# Patient Record
Sex: FEMALE | Race: WHITE | NOT HISPANIC OR LATINO | Employment: OTHER | URBAN - METROPOLITAN AREA
[De-identification: names, ages, dates, MRNs, and addresses within clinical notes are randomized per-mention and may not be internally consistent; named-entity substitution may affect disease eponyms.]

---

## 2021-05-31 ENCOUNTER — RECORDS - HEALTHEAST (OUTPATIENT)
Dept: ADMINISTRATIVE | Facility: CLINIC | Age: 61
End: 2021-05-31

## 2023-08-28 ENCOUNTER — TELEPHONE (OUTPATIENT)
Dept: VASCULAR SURGERY | Facility: CLINIC | Age: 63
End: 2023-08-28
Payer: MEDICARE

## 2023-08-28 ENCOUNTER — OFFICE VISIT (OUTPATIENT)
Dept: VASCULAR SURGERY | Facility: CLINIC | Age: 63
End: 2023-08-28
Payer: MEDICARE

## 2023-08-28 VITALS
HEART RATE: 85 BPM | SYSTOLIC BLOOD PRESSURE: 137 MMHG | OXYGEN SATURATION: 99 % | DIASTOLIC BLOOD PRESSURE: 69 MMHG | TEMPERATURE: 97.7 F

## 2023-08-28 DIAGNOSIS — R60.0 LOWER EXTREMITY EDEMA: ICD-10-CM

## 2023-08-28 DIAGNOSIS — L97.912 SKIN ULCER OF RIGHT LOWER LEG WITH FAT LAYER EXPOSED (H): Primary | ICD-10-CM

## 2023-08-28 PROBLEM — I10 ESSENTIAL HYPERTENSION: Status: ACTIVE | Noted: 2019-06-21

## 2023-08-28 PROCEDURE — 99204 OFFICE O/P NEW MOD 45 MIN: CPT | Mod: 25 | Performed by: FAMILY MEDICINE

## 2023-08-28 PROCEDURE — 11042 DBRDMT SUBQ TIS 1ST 20SQCM/<: CPT | Performed by: FAMILY MEDICINE

## 2023-08-28 PROCEDURE — G0463 HOSPITAL OUTPT CLINIC VISIT: HCPCS | Mod: 25 | Performed by: FAMILY MEDICINE

## 2023-08-28 RX ORDER — SULFAMETHOXAZOLE/TRIMETHOPRIM 800-160 MG
1 TABLET ORAL
COMMUNITY
Start: 2023-08-25 | End: 2023-09-04

## 2023-08-28 RX ORDER — AMLODIPINE BESYLATE 10 MG/1
10 TABLET ORAL DAILY
COMMUNITY
Start: 2022-05-07

## 2023-08-28 ASSESSMENT — PAIN SCALES - GENERAL: PAINLEVEL: MILD PAIN (2)

## 2023-08-28 NOTE — PATIENT INSTRUCTIONS
Wound care supplies were ordered today through [employer] and if you are not receiving your supplies or have a question on your bill please contact [facility] at [phone]. Please allow 2-5 business days for delivery of supplies. You may get a call from a [phone] # if there are additional information [facility] needs. It is important to  or return their call. PLEASE NOTE: If you need to return your supplies, you MUST call customer service within 15 days of delivery date.         Wound Care Instructions    EVERY OTHER DAY and as needed    1. Cleanse your wound with saline, pat dry. DO NOT REMOVE OLD ENDOFORM- LEAVE ON THE WOUND    2.  Cut Endoform to the size of the wound. It is okay if it overlap the edges a small amount.  Then, moisten the Endoform with a drop or two saline.    3. Apply Endoform to wound (over the old Endoform) then cover with mepilex     Compression: tubigrip    *Endoform is naturally used by the body over time so you may not find it in the wound when you change your dressing.  If you do find some, gently cleanse the wound with saline. Do not remove the remaining Endoform*      It is ok to get your wound wet in the bath or shower    It is recommended that you elevate your legs throughout the day: approx 2-3 times each day  Elevate them above the level of your heart for 30 min.   Ways to do this:   Lay on the couch or your bed and prop your legs up on pillows   Recline back as far as you can go in your recliner and prop your legs on pillows.     Doing these things will help reduce the edema in your legs.    High Protein Foods  When you have an open ulcer, your bodies protein needs are much higher, so it is recommended eat good sources of protein or take a protein supplement!    Protein Supplements  -Premier Protein  -Ensure  -Boost  -Glucerna, if diabetic    Chicken  -Chicken breast, 3.5oz.-30 grams protein  -Chicken meat, cooked, 4 oz.    Beef  -Hamburger renuka, 4 oz-28 grams protein  -Steak,  6 oz-42 grams  -Most cuts of beef- 7 grams of protein per ounce    Fish  -Most fish fillets or steaks are about 22 grams of protein for 3 1/2 oz(100 grams) of cooked fish, or 6 grams per ounce  -Tuna, 6 oz can-40 grams of protein    Pork  -Pork chop, average-22 grams protein  -Pork loin or tenderloin, 4 oz.-29 grams  -Ham, 3oz serving- 19 grams  -Lantigua, 1 slice-3 grams    Eggs and Dairy  -Egg, large-7 grams  -Milk, 1 cup-8 grams  -Cottage cheese, 1/2 cup-15 grams  -Greek yogurt, 1 cup-usually 8-12 grams, check label    Beans  -Soy milk, 1 cup-6-10 grams  -Most beans(black, fong, lentils, etc.) about 7-10 grams protein per half cup of cooked beans    Nuts and Seeds  -Peanut butter, 2 Tablespoons- 8 grams protein  -Almonds, 1/4 cup- 8 grams  -Peanuts, 1/4 cup-9 grams  -Sunflower seeds, 1/4 cup- 6 grams        If for some reason you are not able to get your dressing(s) changed as outlined above (due to illness, lack of supplies, lack of help) please do the following: remove old, soiled dressings; wash the wounds with saline; pat dry; apply ABD pad or other absorbant pad and secure with rolled gauze; avoid tape directly on your skin; Call the clinic as soon as possible to let us know what the current issues are in receiving wound care [phone].      SEEK MEDICAL CARE IF:  You have an increase in swelling, pain, or redness around the wound.  You have an increase in the amount of pus coming from the wound.  There is a bad smell coming from the wound.  The wound appears to be worsening/enlarging  You have a fever greater than 101.5 F      It is ok to continue current wound care treatment/products for the next 2-3 days until new wound care supplies are ordered and arrive. If longer than this please contact our office at [phone].    If you have a 2 layer or 4 layer compression wrap on these are safe to have on for ONLY 7 days. If for some reason you are not able to get the wrap(s) changed (due to illness; lack of  supplies, lack of help, lack of transportation) please do the following: unwrap the old 2 or 4 layer compression wrap; avoid using scissors as you could cut your skin and cause wounds; use tubular compression when available. Call to reschedule your home care or clinic visit appointment as soon as possible.    Please NOTE: if you are 15 minutes late to your clinic appointment you will have to be rescheduled. Please call our clinic as soon as possible if you know you will not be able to get to your appointment at [phone].    If you fail to show up to 3 scheduled clinic appointments you will be dismissed from our clinic.              We want to hear from you!  In the next few weeks, you should receive a call or email to complete a survey about your visit at [facility]. Please help us improve your appointment experience by letting us know how we did today. We strive to make your experience good and value any ways in which we could do better.      We value your input and suggestions.    Thank you for choosing the [facility]!

## 2023-08-28 NOTE — LETTER
"[facility]  [address] [place] [address] [address]  [place], [place] [address]  [phone]      Fax [phone]    [facility]           Fax: [phone]            Customer Service: [phone]        Account #: [id number]    Wound Dressing Rx and Order Form  Order Status: new  Verbal:   Date: 2023     Alyssa Bhatia  Gender: female  : 1960  [address] [facility] [address]  [facility] [address]  [phone] (home)     Medical Record: [id number]  Primary Care Provider: No Ref-Primary, Physician      ICD-10-CM    1. Skin ulcer of right lower leg with fat layer exposed (H)  L97.912 DEBRIDE SKIN/SUBQ TISSUE     Wound care      2. Lower extremity edema  R60.0 Wound care            Insurance Info:  INSURER: Payor: MEDICARE / Plan: MEDICARE / Product Type: Medicare /   Policy ID#:  [id number]  SECONDARY INSURANCE:  [id number]  Secondary Policy ID#:  [id number]        Physician Info:   Name:  AUGUSTINE MEDINA     Dept Address/Phones:   [address] [place] [address], [address]  [facility] [address] [phone]  Fax: [phone]    Lymphedema circumferential measurements (in cm):       No data to display                  Wound info:  VASC Wound right leg (Active)   Pre Size Length 1.9 23 1300   Pre Size Width 4.2 23 1300   Pre Size Depth 0.1 23 1300   Pre Total Sq cm 7.98 23 1300   Post Size Length 1.9 23 1300   Post Size Width 4.2 23 1300   Post Size Depth 0.1 23 1300   Post Total Sq cm 7.98 23 1300   Number of days: 0        Drainage: moderate  Thickness:  full  Duration of Need: 30 DAYS  Days Supply: 30 DAYS  Start Date: 2023  Starter Kit: Ancillary Kit (saline, gloves, gauze)  Qualifying wound/Debridement: Yes      Dressing Type Brand Size Frequency of change -or- Quantity   Primary endoform  2\"x2\" EVERY OTHER DAY and as needed    Mepilex bordered adhesive bandage  3\"x3\" EVERY OTHER DAY and as needed     No substitutions preferred. Call " [phone].         OK to forward to covered supplier.    Electronically Signed Physician:  AUGUSTINE MEDINA             Date: August 28, 2023

## 2023-08-28 NOTE — PROGRESS NOTES
Wound Clinic Note         Visit date: 08/28/2023       Cheif Complaint:     Alyssa Bhatia is a 63 year old  female had concerns including R  leg wound.  The patient has a right leg ulcer.          HISTORY OF PRESENT ILLNESS:    Alyssa Bhatia reports the wound has been present since mid August 2023.  The wound began due to the area being bumped.   She has been started on antibiotic which she continues to take now with no symptoms of an adverse reaction.  Prior to this she has not had other difficult to heal wounds on the legs.  The patient denies a history of congestive heart failure, previous vein procedures or previous DVT.       The patient has been bandaging the wound with antibiotic ointment and a Band-Aid changed once a day.  There has been Light serous  drainage from the wound.       The pateint denies fevers or chills.  They report the pain from the wound has been 0/10 and has remained about the same recently.      The patient reports they currently do not have any routine for elevating their legs.  and The patient confirms they do sleep in a bed.     Today the patient reports maintaining a regular diet without special attention to protein.        The patient denies a history of diabetes, smoking or chronic steroid use.         The patient has recently had some symptoms of wound infection and is currently taking antibiotics which they have been tolerating well with no symptoms of an adverse reaction.       Problem List:   Past Medical History:   Diagnosis Date    Lower extremity edema 08/28/2023             Family Hx: family history is not on file.       Surgical Hx: No past surgical history on file.       Allergies:    Allergies   Allergen Reactions    Penicillins Other (See Comments)     Reaction as an infant - rash              Medication History:    Current Outpatient Medications   Medication Sig    amLODIPine (NORVASC) 10 MG tablet Take 10 mg by mouth daily    sulfamethoxazole-trimethoprim (BACTRIM  DS) 800-160 MG tablet Take 1 tablet by mouth     No current facility-administered medications for this visit.         Tobacco History:  reports that she has quit smoking. Her smoking use included cigarettes. She has never used smokeless tobacco.       REVIEW OF SYMPTOMS:   The review of systems was negative except as noted in the HPI.           PHYSICAL EXAMINATION:     /69   Pulse 85   Temp 97.7  F (36.5  C)   SpO2 99%            GENERAL: The patient overall appears well and is no acute distress.   HEAD: normocephalic   EYES: Sclera and conjunctiva clear   NECK: no obvious masses   LUNGS: breathing is unlabored.   EXTREMITIES: No clubbing, cyanosis or edema   SKIN: No rashes or other abnormalities except as noted under the Wound section below.   NEUROLOGICAL: normal motor and sensory function   EDEMA: Moderate       WOUND: The wound appears healthy with no sign of infection.   Wound bed: necrotic material  Periwound: healthy intact skin  Fairly small superficial wound on the right anterior shin.  As noted above there are are no signs of infection present today.      Also see below for wound details:       Circumferential volume measures:             No data to display                Ulceration(s)/Wound(s):   Please see the media tab under the chart review for pictures of the wounds.  Nursing staff removed dressings and cleansed wound.    VASC Wound right leg (Active)   Pre Size Length 1.9 08/28/23 1300   Pre Size Width 4.2 08/28/23 1300   Pre Size Depth 0.1 08/28/23 1300   Pre Total Sq cm 7.98 08/28/23 1300             No results for input(s): HGBA1C, A1C, EAG in the last 54097 hours.    Invalid input(s): PKHPYHROB5N       No results for input(s): ALBUMIN in the last 66832 hours.           Procedure note: , Informed Consent:  Patient acknowledges that I have explained the patient's general medical condition to him/her.  Patient has been informed and acknowledges that I have explained the risks or  complications of wound debridement including, but not limited to, scarring, damage to blood vessels or surrounding areas such as nerves and organs, allergic reactions to topical and injected anaesthetic and/or skin prep solutions.  Other risks include excessive bleeding, removal of healthy tissue, infection, pain and inflammation, and prolonged or failure to heal.  Patient acknowledges that bleeding after debridement and pain may worsen after debridement and that dead/necrotic tissue may cause bacteria and toxins to be released into the bloodstream and cause sepsis or shock.  Patient acknowledges that I have explained that the wound may be larger after debridement.  Patient acknowledges that they may need serial debridements while under care in the wound department.  Patient acknowledges that they were given an opportunity to ask questions about treatment and I have answered patient's questions.   , Anesthetized as needed with lidocaine. , Using a curette and/or a scalpel I performed an excisional debridement removing all necrotic material at the right leg wound in to the level of viable subcutaneous tissue.  I obtained hemostasis with direct pressure.  The patient tolerated the procedure well. , EBL: <5 ml , and Total debridement surface area: Less than 20 cm                  ASSESSMENT:   This is a 63 year old  female with a right leg ulcer. , the patient also has lower extremity edema which was also managed during today's clinic visit.          PLAN:   We will bandage the wound with endoform, Mepilex bandage and a Tubigrip stocking changed every other day.  We went over bathing instructions.    Separate from the wound care instructions we then discussed management strategies for lower extremity edema.  I explained the keys for managing lower extremity edema are compression and elevation.  I explained to the patient today that controlling the edema is probably the most important thing we can do to help heal the  wound.  I have specifically recommended that they lay down with their legs above the level of the heart for 30 minutes at least twice a day.     I have explained to the patient the importance of protein intake to wound healing.  I have explained that increasing protein intake will speed wound healing.  We discussed several types of food that are high in protein and the wound care nurse gave the patient a handout that summarizes this information.  In addition to further speed wound healing I have encouraged the patient to take a protein supplement.   The patient will return to the wound clinic in 2 to 3 weeks to see me again.        45 minutes spent on the date of the encounter doing chart review, history and exam, documentation and further activities per the note, this time excludes any procedure time      Herbert Baker MD  08/28/2023   1:36 PM   [facility]  [phone]    This note was electronically signed by Herbert Baker MD

## 2023-08-28 NOTE — TELEPHONE ENCOUNTER
Caller: Alyssa    Provider: MD Herbert Baker    Detailed reason for call: New self referral for non-healing wound. She is contacting [facility] to send recent visit notes.   Please confirm if ok to add at 1:00 today?    Email from patient:  Hello,   Please see my leg wound.. First picture is the day it happened and the second picture is today. It is red around the wound which is on my right ;lower leg. My son in law, who is a Dr at [employer] thought I needed to see a wound Dr.  His name is Alec Hernandez.  I'll work on getting notes from last Friday's visit.    Thanks!   Alyssa  [phone]    August 20, 2023 August 28. 2023        Best phone number to contact: [phone]    Best time to contact: any     Ok to leave a detailed message: Yes

## 2023-09-08 ENCOUNTER — TELEPHONE (OUTPATIENT)
Dept: VASCULAR SURGERY | Facility: CLINIC | Age: 63
End: 2023-09-08
Payer: MEDICARE

## 2023-09-08 NOTE — TELEPHONE ENCOUNTER
PT VIA EMAIL:     The 1st picture is after I got done showering. The 2nd is before I showered. I am on my last day of antibiotics.  Just making sure that I am making progress. It is a little red around the area. Please advise if there is anything I shadowed. Otherwise I will keep changing dressing every other day.  Thank you.  Alyssa Bhatia  [phone]

## 2023-09-14 ENCOUNTER — OFFICE VISIT (OUTPATIENT)
Dept: VASCULAR SURGERY | Facility: CLINIC | Age: 63
End: 2023-09-14
Attending: FAMILY MEDICINE
Payer: MEDICARE

## 2023-09-14 VITALS
TEMPERATURE: 97.8 F | DIASTOLIC BLOOD PRESSURE: 76 MMHG | RESPIRATION RATE: 16 BRPM | HEART RATE: 80 BPM | SYSTOLIC BLOOD PRESSURE: 142 MMHG

## 2023-09-14 DIAGNOSIS — R60.0 LOWER EXTREMITY EDEMA: ICD-10-CM

## 2023-09-14 DIAGNOSIS — L97.912 SKIN ULCER OF RIGHT LOWER LEG WITH FAT LAYER EXPOSED (H): Primary | ICD-10-CM

## 2023-09-14 PROCEDURE — G0463 HOSPITAL OUTPT CLINIC VISIT: HCPCS | Mod: 25 | Performed by: FAMILY MEDICINE

## 2023-09-14 PROCEDURE — 11042 DBRDMT SUBQ TIS 1ST 20SQCM/<: CPT | Performed by: FAMILY MEDICINE

## 2023-09-14 PROCEDURE — 99214 OFFICE O/P EST MOD 30 MIN: CPT | Mod: 25 | Performed by: FAMILY MEDICINE

## 2023-09-14 ASSESSMENT — PAIN SCALES - GENERAL: PAINLEVEL: NO PAIN (0)

## 2023-09-14 NOTE — PATIENT INSTRUCTIONS
Wound care supplies were not ordered or needed today.        Wound Care Instructions    EVERY OTHER DAY and as needed    1. Cleanse your wound with saline, pat dry. DO NOT REMOVE OLD ENDOFORM- LEAVE ON THE WOUND    2.  Cut Endoform to the size of the wound. It is okay if it overlap the edges a small amount.  Then, moisten the Endoform with a drop or two saline.    3. Apply Endoform to wound (over the old Endoform) then cover with mepilex     Compression: tubigrip    *Endoform is naturally used by the body over time so you may not find it in the wound when you change your dressing.  If you do find some, gently cleanse the wound with saline. Do not remove the remaining Endoform*      It is ok to get your wound wet in the bath or shower    It is recommended that you elevate your legs throughout the day: approx 2-3 times each day  Elevate them above the level of your heart for 30 min.   Ways to do this:   Lay on the couch or your bed and prop your legs up on pillows   Recline back as far as you can go in your recliner and prop your legs on pillows.     Doing these things will help reduce the edema in your legs.    High Protein Foods  When you have an open ulcer, your bodies protein needs are much higher, so it is recommended eat good sources of protein or take a protein supplement!    Protein Supplements  -Premier Protein  -Ensure  -Boost  -Glucerna, if diabetic    Chicken  -Chicken breast, 3.5oz.-30 grams protein  -Chicken meat, cooked, 4 oz.    Beef  -Hamburger renuka, 4 oz-28 grams protein  -Steak, 6 oz-42 grams  -Most cuts of beef- 7 grams of protein per ounce    Fish  -Most fish fillets or steaks are about 22 grams of protein for 3 1/2 oz(100 grams) of cooked fish, or 6 grams per ounce  -Tuna, 6 oz can-40 grams of protein    Pork  -Pork chop, average-22 grams protein  -Pork loin or tenderloin, 4 oz.-29 grams  -Ham, 3oz serving- 19 grams  -Lantigua, 1 slice-3 grams    Eggs and Dairy  -Egg, large-7 grams  -Milk, 1 cup-8  grams  -Cottage cheese, 1/2 cup-15 grams  -Greek yogurt, 1 cup-usually 8-12 grams, check label    Beans  -Soy milk, 1 cup-6-10 grams  -Most beans(black, fong, lentils, etc.) about 7-10 grams protein per half cup of cooked beans    Nuts and Seeds  -Peanut butter, 2 Tablespoons- 8 grams protein  -Almonds, 1/4 cup- 8 grams  -Peanuts, 1/4 cup-9 grams  -Sunflower seeds, 1/4 cup- 6 grams        If for some reason you are not able to get your dressing(s) changed as outlined above (due to illness, lack of supplies, lack of help) please do the following: remove old, soiled dressings; wash the wounds with saline; pat dry; apply ABD pad or other absorbant pad and secure with rolled gauze; avoid tape directly on your skin; Call the clinic as soon as possible to let us know what the current issues are in receiving wound care [phone].      SEEK MEDICAL CARE IF:  You have an increase in swelling, pain, or redness around the wound.  You have an increase in the amount of pus coming from the wound.  There is a bad smell coming from the wound.  The wound appears to be worsening/enlarging  You have a fever greater than 101.5 F      It is ok to continue current wound care treatment/products for the next 2-3 days until new wound care supplies are ordered and arrive. If longer than this please contact our office at [phone].    If you have a 2 layer or 4 layer compression wrap on these are safe to have on for ONLY 7 days. If for some reason you are not able to get the wrap(s) changed (due to illness; lack of supplies, lack of help, lack of transportation) please do the following: unwrap the old 2 or 4 layer compression wrap; avoid using scissors as you could cut your skin and cause wounds; use tubular compression when available. Call to reschedule your home care or clinic visit appointment as soon as possible.    Please NOTE: if you are 15 minutes late to your clinic appointment you will have to be rescheduled. Please call our  clinic as soon as possible if you know you will not be able to get to your appointment at [phone].    If you fail to show up to 3 scheduled clinic appointments you will be dismissed from our clinic.              We want to hear from you!  In the next few weeks, you should receive a call or email to complete a survey about your visit at [facility]. Please help us improve your appointment experience by letting us know how we did today. We strive to make your experience good and value any ways in which we could do better.      We value your input and suggestions.    Thank you for choosing the [facility]!

## 2023-09-14 NOTE — PROGRESS NOTES
Wound Clinic Note         Visit date: 09/14/2023       Cheif Complaint:     Alyssa Bhatia is a 63 year old  female had concerns including right leg wound.  The patient has a right leg ulcer.          HISTORY OF PRESENT ILLNESS:    Alyssa Bhatia reports the wound has been present since mid August 2023.  The wound began due to the area being bumped.    Prior to this she has not had other difficult to heal wounds on the legs.  The patient denies a history of congestive heart failure, previous vein procedures or previous DVT.       Since her last clinic visit with me she has been bandaging the wound with endoform, Mepilex bandage and a Tubigrip stocking changed every other day.  There is been light to moderate serous drainage from the wound.  There is been no difficulties with the dressing changes.  She has completed taking all antibiotics with no symptoms of an adverse reaction.         The pateint denies fevers or chills.  They report the pain from the wound has been 0/10 and has remained about the same recently.      The patient reports laying down to elevate their legs above the level of their heart at least twice a day.  and The patient confirms they do sleep in a bed.     Today the patient reports maintaining a high protein diet, but has not been taking protein supplements lately.        The patient denies a history of diabetes, smoking or chronic steroid use.         The patient has not had any symptoms of infection relating to the wound recently and is not currently on antibiotics.       Problem List:   Past Medical History:   Diagnosis Date    Hypertension     Lower extremity edema 08/28/2023             Family Hx: family history includes Breast Cancer in her maternal aunt; Cataracts in her mother; Colon Cancer in her father; Hypertension in her father, maternal grandmother, and mother; Macular Degeneration in her maternal aunt, maternal grandmother, and mother.       Surgical Hx:   Past Surgical History:    Procedure Laterality Date    APPENDECTOMY      DILATION AND CURETTAGE  01/25/2007          Allergies:    Allergies   Allergen Reactions    Penicillins Other (See Comments)     Reaction as an infant - rash              Medication History:    Current Outpatient Medications   Medication Sig    amLODIPine (NORVASC) 10 MG tablet Take 10 mg by mouth daily     No current facility-administered medications for this visit.         Tobacco History:  reports that she has quit smoking. Her smoking use included cigarettes. She has never used smokeless tobacco.       REVIEW OF SYMPTOMS:   The review of systems was negative except as noted in the HPI.           PHYSICAL EXAMINATION:     BP (!) 142/76   Pulse 80   Temp 97.8  F (36.6  C)   Resp 16            GENERAL: The patient overall appears well and is no acute distress.   HEAD: normocephalic   EYES: Sclera and conjunctiva clear   NECK: no obvious masses   LUNGS: breathing is unlabored.   EXTREMITIES: No clubbing, cyanosis or edema   SKIN: No rashes or other abnormalities except as noted under the Wound section below.   NEUROLOGICAL: normal motor and sensory function   EDEMA: Moderate       WOUND: The wound appears healthy with no sign of infection.   Wound bed: necrotic material  Periwound: healthy intact skin  Fairly small superficial wound on the right anterior shin.    Wound measurement is smaller today compared with her last clinic visit.      Also see below for wound details:       Circumferential volume measures:             No data to display                Ulceration(s)/Wound(s):   Please see the media tab under the chart review for pictures of the wounds.  Nursing staff removed dressings and cleansed wound.    VASC Wound right leg (Active)   Pre Size Length 1.2 09/14/23 1600   Pre Size Width 2.4 09/14/23 1600   Pre Size Depth 0.2 09/14/23 1600   Pre Total Sq cm 2.88 09/14/23 1600               No results for input(s): HGBA1C, A1C, EAG in the last 72000  hours.    Invalid input(s): [id number]       No results for input(s): ALBUMIN in the last 26436 hours.           Procedure note: , I had previous obtain informed consent from the patient to perform serial debridements, I confirmed this again with the patient today verbally. , Anesthetized as needed with lidocaine. , Using a curette and/or a scalpel I performed an excisional debridement removing all necrotic material at the right leg wound in to the level of viable subcutaneous tissue.  I obtained hemostasis with direct pressure.  The patient tolerated the procedure well. , EBL: <5 ml , and Total debridement surface area: Less than 20 cm                  ASSESSMENT:   This is a 63 year old  female with a right leg ulcer. , the patient also has lower extremity edema which was also managed during today's clinic visit.          PLAN:   We will bandage the wound with endoform, Mepilex bandage and a Tubigrip stocking changed every other day.  We went over bathing instructions.    Separate from the wound care instructions we then discussed management strategies for lower extremity edema.  I explained the keys for managing lower extremity edema are compression and elevation.  I have encouraged the patient to continue to elevate the legs as they have been doing, including laying down with their legs above the level of the heart for 30 minutes at least twice a day.     I have encouraged the patient to continue on their high protein diet to aid in wound healing.   The patient will return to the wound clinic in 2 to 3 weeks to see me again.        30 minutes spent on the date of the encounter doing chart review, history and exam, documentation and further activities per the note, this time excludes any procedure time      Herbert Baker MD  09/14/2023   4:15 PM   [facility]  [phone]    This note was electronically signed by Herbert Baker MD

## 2023-09-27 ENCOUNTER — TELEPHONE (OUTPATIENT)
Dept: VASCULAR SURGERY | Facility: CLINIC | Age: 63
End: 2023-09-27
Payer: MEDICARE

## 2023-09-27 NOTE — PATIENT INSTRUCTIONS
Wound care supplies were ordered today through [employer] and if you are not receiving your supplies or have a question on your bill please contact [facility] at [phone]. Please allow 2-5 business days for delivery of supplies. You may get a call from a [phone] # if there are additional information Zeenat needs. It is important to  or return their call. PLEASE NOTE: If you need to return your supplies, you MUST call customer service within 15 days of delivery date.         Wound Care Instructions    EVERY OTHER DAY and as needed    1. Cleanse your wound with saline, pat dry. DO NOT REMOVE OLD ENDOFORM- LEAVE ON THE WOUND    2.  Cut Endoform to the size of the wound. It is okay if it overlap the edges a small amount.  Then, moisten the Endoform with a drop or two saline.    3. Apply Endoform to wound (over the old Endoform) then cover with mepilex     Compression: tubigrip    *Endoform is naturally used by the body over time so you may not find it in the wound when you change your dressing.  If you do find some, gently cleanse the wound with saline. Do not remove the remaining Endoform*      It is ok to get your wound wet in the bath or shower    It is recommended that you elevate your legs throughout the day: approx 2-3 times each day  Elevate them above the level of your heart for 30 min.   Ways to do this:   Lay on the couch or your bed and prop your legs up on pillows   Recline back as far as you can go in your recliner and prop your legs on pillows.     Doing these things will help reduce the edema in your legs.        If for some reason you are not able to get your dressing(s) changed as outlined above (due to illness, lack of supplies, lack of help) please do the following: remove old, soiled dressings; wash the wounds with saline; pat dry; apply ABD pad or other absorbant pad and secure with rolled gauze; avoid tape directly on your skin; Call the clinic as soon as possible to let us know what the  current issues are in receiving wound care [phone].      SEEK MEDICAL CARE IF:  You have an increase in swelling, pain, or redness around the wound.  You have an increase in the amount of pus coming from the wound.  There is a bad smell coming from the wound.  The wound appears to be worsening/enlarging  You have a fever greater than 101.5 F      It is ok to continue current wound care treatment/products for the next 2-3 days until new wound care supplies are ordered and arrive. If longer than this please contact our office at [phone].    If you have a 2 layer or 4 layer compression wrap on these are safe to have on for ONLY 7 days. If for some reason you are not able to get the wrap(s) changed (due to illness; lack of supplies, lack of help, lack of transportation) please do the following: unwrap the old 2 or 4 layer compression wrap; avoid using scissors as you could cut your skin and cause wounds; use tubular compression when available. Call to reschedule your home care or clinic visit appointment as soon as possible.    Please NOTE: if you are 15 minutes late to your clinic appointment you will have to be rescheduled. Please call our clinic as soon as possible if you know you will not be able to get to your appointment at [phone].    If you fail to show up to 3 scheduled clinic appointments you will be dismissed from our clinic.              We want to hear from you!  In the next few weeks, you should receive a call or email to complete a survey about your visit at [facility]. Please help us improve your appointment experience by letting us know how we did today. We strive to make your experience good and value any ways in which we could do better.      We value your input and suggestions.    Thank you for choosing the [facility]!

## 2023-09-27 NOTE — TELEPHONE ENCOUNTER
Spoke with Alyssa. She will send a picture via [employer] tomorrow for review. Denies any other signs/symptoms of infection at this time.

## 2023-09-27 NOTE — TELEPHONE ENCOUNTER
Caller: Patient    Provider: MD Herbert Baker    Detailed reason for call: Patient is concerned with a possible infection; she states that she took her bandages off so she could shower, and noticed an increase in discharge coming from the wound as well as an odor. Patient has a follow up scheduled on Monday.     Best phone number to contact: [phone]    Best time to contact: Any    Ok to leave a detailed message: Yes    Ok to speak to authorized person if needed: No      (Noted to patient if reason is related to wound or incision, to please send a photo via email or [employer].)

## 2023-09-28 NOTE — TELEPHONE ENCOUNTER
Attempted to call patient to see if she was still having symptoms and if she was going to send a photo. Her mailbox is full.

## 2023-09-29 NOTE — TELEPHONE ENCOUNTER
Attempted to call patient (3rd attempt), voicemail full.  Sent patient [employer] message.  She is scheduled to be seen in clinic on Monday.

## 2023-10-02 ENCOUNTER — OFFICE VISIT (OUTPATIENT)
Dept: VASCULAR SURGERY | Facility: CLINIC | Age: 63
End: 2023-10-02
Payer: MEDICARE

## 2023-10-02 VITALS
SYSTOLIC BLOOD PRESSURE: 152 MMHG | HEART RATE: 72 BPM | TEMPERATURE: 97.9 F | RESPIRATION RATE: 18 BRPM | DIASTOLIC BLOOD PRESSURE: 84 MMHG

## 2023-10-02 DIAGNOSIS — R60.0 LOWER EXTREMITY EDEMA: ICD-10-CM

## 2023-10-02 DIAGNOSIS — L97.912 SKIN ULCER OF RIGHT LOWER LEG WITH FAT LAYER EXPOSED (H): Primary | ICD-10-CM

## 2023-10-02 PROCEDURE — G0463 HOSPITAL OUTPT CLINIC VISIT: HCPCS | Mod: 25 | Performed by: FAMILY MEDICINE

## 2023-10-02 PROCEDURE — 99214 OFFICE O/P EST MOD 30 MIN: CPT | Mod: 25 | Performed by: FAMILY MEDICINE

## 2023-10-02 PROCEDURE — 11042 DBRDMT SUBQ TIS 1ST 20SQCM/<: CPT | Performed by: FAMILY MEDICINE

## 2023-10-02 RX ORDER — LEVOFLOXACIN 750 MG/1
750 TABLET, FILM COATED ORAL DAILY
COMMUNITY
Start: 2023-09-29

## 2023-10-02 RX ORDER — DOXYCYCLINE HYCLATE 100 MG
100 TABLET ORAL DAILY
COMMUNITY
Start: 2023-09-29

## 2023-10-02 ASSESSMENT — PAIN SCALES - GENERAL: PAINLEVEL: MILD PAIN (2)

## 2023-10-02 NOTE — LETTER
"[facility]  [address]  [place], [place] [address]  [phone]      Fax [phone]    [facility]           Fax: [phone]            Customer Service: [phone]        Account #: [id number]    Wound Dressing Rx and Order Form  Order Status: refill  Verbal:   Date: 2023     Alyssa Bhatia  Gender: female  : 1960  [address] [place] [address]  [phone] (home)     Medical Record: [id number]  Primary Care Provider: No Ref-Primary, Physician      ICD-10-CM    1. Skin ulcer of right lower leg with fat layer exposed (H)  L97.912 DEBRIDE SKIN/SUBQ TISSUE      2. Lower extremity edema  R60.0             Insurance Info:  INSURER: Payor: MEDICARE / Plan: MEDICARE / Product Type: Medicare /   Policy ID#:  [id number]  SECONDARY INSURANCE:  AETNA  Secondary Policy ID#:  [id number]        Physician Info:   Name:  AUGUSTINE MEDINA     Dept Address/Phones:   [facility] [address] [facility] [facility], [address]  [place] [address] [phone]  Fax: [phone]    Lymphedema circumferential measurements (in cm):       No data to display                  Wound info:  VASC Wound right leg (Active)   Pre Size Length 1 10/02/23 1000   Pre Size Width 2 10/02/23 1000   Pre Size Depth 0.3 10/02/23 1000   Pre Total Sq cm 2 10/02/23 1000   Post Size Length 1 10/02/23 1000   Post Size Width 2 10/02/23 1000   Post Size Depth 0.3 10/02/23 1000   Post Total Sq cm 2 10/02/23 1000   Number of days: 35        Drainage: moderate  Thickness:  full  Duration of Need: 30 DAYS  Days Supply: 30 DAYS  Start Date: 10/2/2023  Starter Kit: Ancillary Kit (saline, gloves, gauze)  Qualifying wound/Debridement: Yes      Dressing Type Brand Size Frequency of change -or- Quantity   Primary Mepilex border adhesive bandage  3\"x3\" EVERY OTHER DAY and as needed     No substitutions preferred. Call [phone].         OK to forward to covered supplier.    Electronically Signed " Physician:  AUGUSTINE MEDINA             Date: October 2, 2023

## 2023-10-02 NOTE — PROGRESS NOTES
Wound Clinic Note         Visit date: 10/02/2023       Cheif Complaint:     Alyssa Bhatia is a 63 year old  female had concerns including Wound Check (Right leg wound).  The patient has a right leg ulcer.          HISTORY OF PRESENT ILLNESS:    Alyssa Bhatia reports the wound has been present since mid August 2023.  The wound began due to the area being bumped.    Prior to this she has not had other difficult to heal wounds on the legs.  The patient denies a history of congestive heart failure, previous vein procedures or previous DVT.       Since her last clinic visit with me she noticed an odor from the wound and felt some chills so she went to the urgent care clinic.  She was prescribed an antibiotic which she continues to take now with no symptoms of an adverse reaction.  She reports since then she has not had any further chills.    Since her last clinic visit with me she has been bandaging the wound with endoform, Mepilex bandage and a Tubigrip stocking changed every other day.  There is been light serous drainage from the wound.  There is been no difficulties with the dressing changes.     The pateint denies fevers or chills.  They report the pain from the wound has been 0/10 and has remained about the same recently.      The patient reports laying down to elevate their legs above the level of their heart at least twice a day.  and The patient confirms they do sleep in a bed.     Today the patient reports maintaining a high protein diet, but has not been taking protein supplements lately.        The patient denies a history of diabetes, smoking or chronic steroid use.         The patient has recently had some symptoms of wound infection and is currently taking antibiotics which they have been tolerating well with no symptoms of an adverse reaction.       Problem List:   Past Medical History:   Diagnosis Date    Hypertension     Lower extremity edema 08/28/2023             Family Hx: family history  includes Breast Cancer in her maternal aunt; Cataracts in her mother; Colon Cancer in her father; Hypertension in her father, maternal grandmother, and mother; Macular Degeneration in her maternal aunt, maternal grandmother, and mother.       Surgical Hx:   Past Surgical History:   Procedure Laterality Date    APPENDECTOMY      DILATION AND CURETTAGE  01/25/2007          Allergies:    Allergies   Allergen Reactions    Penicillins Other (See Comments)     Reaction as an infant - rash              Medication History:    Current Outpatient Medications   Medication Sig    amLODIPine (NORVASC) 10 MG tablet Take 10 mg by mouth daily    doxycycline hyclate (VIBRA-TABS) 100 MG tablet Take 100 mg by mouth daily    levofloxacin (LEVAQUIN) 750 MG tablet Take 750 mg by mouth daily     No current facility-administered medications for this visit.         Tobacco History:  reports that she has quit smoking. Her smoking use included cigarettes. She has never used smokeless tobacco.       REVIEW OF SYMPTOMS:   The review of systems was negative except as noted in the HPI.           PHYSICAL EXAMINATION:     BP (!) 152/84   Pulse 58   Temp 97.9  F (36.6  C)   Resp 18            GENERAL: The patient overall appears well and is no acute distress.   HEAD: normocephalic   EYES: Sclera and conjunctiva clear   NECK: no obvious masses   LUNGS: breathing is unlabored.   EXTREMITIES: No clubbing, cyanosis or edema   SKIN: No rashes or other abnormalities except as noted under the Wound section below.   NEUROLOGICAL: normal motor and sensory function   EDEMA: Moderate       WOUND: The wound appears healthy with no sign of infection.   Wound bed: necrotic material  Periwound: healthy intact skin  Fairly small superficial wound on the right anterior shin.    Wound measurement is again smaller today compared with her last clinic visit.      Also see below for wound details:       Circumferential volume measures:             No data to display                 Ulceration(s)/Wound(s):   Please see the media tab under the chart review for pictures of the wounds.  Nursing staff removed dressings and cleansed wound.    VASC Wound right leg (Active)   Pre Size Length 1 10/02/23 1000   Pre Size Width 2 10/02/23 1000   Pre Size Depth 0.3 10/02/23 1000   Pre Total Sq cm 2 10/02/23 1000                 No results for input(s): HGBA1C, A1C, EAG in the last 20103 hours.    Invalid input(s): [id number]       No results for input(s): ALBUMIN in the last 60605 hours.           Procedure note: , I had previous obtain informed consent from the patient to perform serial debridements, I confirmed this again with the patient today verbally. , Anesthetized as needed with lidocaine. , Using a curette and/or a scalpel I performed an excisional debridement removing all necrotic material at the right leg wound in to the level of viable subcutaneous tissue.  I obtained hemostasis with direct pressure.  The patient tolerated the procedure well. , EBL: <5 ml , and Total debridement surface area: Less than 20 cm                  ASSESSMENT:   This is a 63 year old  female with a right leg ulcer. , the patient also has lower extremity edema which was also managed during today's clinic visit.          PLAN:   We will bandage the wound with endoform, Mepilex bandage and a Tubigrip stocking changed every other day.      Separate from the wound care instructions we then discussed management strategies for lower extremity edema.  I explained the keys for managing lower extremity edema are compression and elevation.  I have encouraged the patient to continue to elevate the legs as they have been doing, including laying down with their legs above the level of the heart for 30 minutes at least twice a day.     I have encouraged the patient to continue on their high protein diet to aid in wound healing.   The patient will return to the wound clinic in 2 to 3 weeks to see me again.        30  minutes spent on the date of the encounter doing chart review, history and exam, documentation and further activities per the note, this time excludes any procedure time      Herbert Baker MD  10/02/2023   10:56 AM   [facility]  [phone]    This note was electronically signed by Herbert Baker MD

## 2023-10-11 ENCOUNTER — TELEPHONE (OUTPATIENT)
Dept: VASCULAR SURGERY | Facility: CLINIC | Age: 63
End: 2023-10-11
Payer: MEDICARE

## 2023-10-12 NOTE — PATIENT INSTRUCTIONS
Wound care supplies were ordered today through [employer] and if you are not receiving your supplies or have a question on your bill please contact [facility] at [phone]. Please allow 2-5 business days for delivery of supplies. You may get a call from a [phone] # if there are additional information Zeenat needs. It is important to  or return their call. PLEASE NOTE: If you need to return your supplies, you MUST call customer service within 15 days of delivery date.         Wound Care Instructions    EVERY OTHER DAY and as needed    1. Cleanse your wound with saline, pat dry. DO NOT REMOVE OLD ENDOFORM- LEAVE ON THE WOUND    2.  Cut Endoform to the size of the wound. It is okay if it overlap the edges a small amount.  Then, moisten the Endoform with a drop or two saline.    3. Apply Endoform to wound (over the old Endoform) then cover with mepilex     Compression: tubigrip    *Endoform is naturally used by the body over time so you may not find it in the wound when you change your dressing.  If you do find some, gently cleanse the wound with saline. Do not remove the remaining Endoform*      It is ok to get your wound wet in the bath or shower    It is recommended that you elevate your legs throughout the day: approx 2-3 times each day  Elevate them above the level of your heart for 30 min.   Ways to do this:   Lay on the couch or your bed and prop your legs up on pillows   Recline back as far as you can go in your recliner and prop your legs on pillows.     Doing these things will help reduce the edema in your legs.        If for some reason you are not able to get your dressing(s) changed as outlined above (due to illness, lack of supplies, lack of help) please do the following: remove old, soiled dressings; wash the wounds with saline; pat dry; apply ABD pad or other absorbant pad and secure with rolled gauze; avoid tape directly on your skin; Call the clinic as soon as possible to let us know what the  current issues are in receiving wound care [phone].      SEEK MEDICAL CARE IF:  You have an increase in swelling, pain, or redness around the wound.  You have an increase in the amount of pus coming from the wound.  There is a bad smell coming from the wound.  The wound appears to be worsening/enlarging  You have a fever greater than 101.5 F      It is ok to continue current wound care treatment/products for the next 2-3 days until new wound care supplies are ordered and arrive. If longer than this please contact our office at [phone].    If you have a 2 layer or 4 layer compression wrap on these are safe to have on for ONLY 7 days. If for some reason you are not able to get the wrap(s) changed (due to illness; lack of supplies, lack of help, lack of transportation) please do the following: unwrap the old 2 or 4 layer compression wrap; avoid using scissors as you could cut your skin and cause wounds; use tubular compression when available. Call to reschedule your home care or clinic visit appointment as soon as possible.    Please NOTE: if you are 15 minutes late to your clinic appointment you will have to be rescheduled. Please call our clinic as soon as possible if you know you will not be able to get to your appointment at [phone].    If you fail to show up to 3 scheduled clinic appointments you will be dismissed from our clinic.              We want to hear from you!  In the next few weeks, you should receive a call or email to complete a survey about your visit at [facility]. Please help us improve your appointment experience by letting us know how we did today. We strive to make your experience good and value any ways in which we could do better.      We value your input and suggestions.    Thank you for choosing the [facility]!

## 2023-10-16 ENCOUNTER — OFFICE VISIT (OUTPATIENT)
Dept: VASCULAR SURGERY | Facility: CLINIC | Age: 63
End: 2023-10-16
Attending: FAMILY MEDICINE
Payer: MEDICARE

## 2023-10-16 VITALS — OXYGEN SATURATION: 95 % | HEART RATE: 81 BPM | DIASTOLIC BLOOD PRESSURE: 80 MMHG | SYSTOLIC BLOOD PRESSURE: 144 MMHG

## 2023-10-16 DIAGNOSIS — R60.0 LOWER EXTREMITY EDEMA: ICD-10-CM

## 2023-10-16 DIAGNOSIS — L97.912 SKIN ULCER OF RIGHT LOWER LEG WITH FAT LAYER EXPOSED (H): Primary | ICD-10-CM

## 2023-10-16 PROCEDURE — 99214 OFFICE O/P EST MOD 30 MIN: CPT | Mod: 25 | Performed by: FAMILY MEDICINE

## 2023-10-16 PROCEDURE — 11042 DBRDMT SUBQ TIS 1ST 20SQCM/<: CPT | Performed by: FAMILY MEDICINE

## 2023-10-16 PROCEDURE — G0463 HOSPITAL OUTPT CLINIC VISIT: HCPCS | Mod: 25 | Performed by: FAMILY MEDICINE

## 2023-10-16 ASSESSMENT — PAIN SCALES - GENERAL: PAINLEVEL: NO PAIN (0)

## 2023-10-16 NOTE — PROGRESS NOTES
Wound Clinic Note         Visit date: 10/16/2023       Cheif Complaint:     Alyssa Bhatia is a 63 year old  female had concerns including Wound Check.  The patient has a right leg ulcer.          HISTORY OF PRESENT ILLNESS:    Alyssa Bhatia reports the wound has been present since mid August 2023.  The wound began due to the area being bumped.    Prior to this she has not had other difficult to heal wounds on the legs.  The patient denies a history of congestive heart failure, previous vein procedures or previous DVT.       Since her last clinic visit with me she has been bandaging the wound with endoform, Mepilex bandage and a Tubigrip stocking changed every other day.  There is been light serous drainage from the wound.  There is been no difficulties with the dressing changes.     The pateint denies fevers or chills.  They report the pain from the wound has been 0/10 and has remained about the same recently.      The patient reports laying down to elevate their legs above the level of their heart at least twice a day.  and The patient confirms they do sleep in a bed.     Today the patient reports maintaining a high protein diet, but has not been taking protein supplements lately.        The patient denies a history of diabetes, smoking or chronic steroid use.         The patient has not had any symptoms of infection relating to the wound recently and is not currently on antibiotics.       Problem List:   Past Medical History:   Diagnosis Date    Hypertension     Lower extremity edema 08/28/2023             Family Hx: family history includes Breast Cancer in her maternal aunt; Cataracts in her mother; Colon Cancer in her father; Hypertension in her father, maternal grandmother, and mother; Macular Degeneration in her maternal aunt, maternal grandmother, and mother.       Surgical Hx:   Past Surgical History:   Procedure Laterality Date    APPENDECTOMY      DILATION AND CURETTAGE  01/25/2007           Allergies:    Allergies   Allergen Reactions    Penicillins Other (See Comments)     Reaction as an infant - rash              Medication History:    Current Outpatient Medications   Medication Sig    amLODIPine (NORVASC) 10 MG tablet Take 10 mg by mouth daily    doxycycline hyclate (VIBRA-TABS) 100 MG tablet Take 100 mg by mouth daily (Patient not taking: Reported on 10/16/2023)    levofloxacin (LEVAQUIN) 750 MG tablet Take 750 mg by mouth daily (Patient not taking: Reported on 10/16/2023)     No current facility-administered medications for this visit.         Tobacco History:  reports that she has quit smoking. Her smoking use included cigarettes. She has never used smokeless tobacco.       REVIEW OF SYMPTOMS:   The review of systems was negative except as noted in the HPI.           PHYSICAL EXAMINATION:     BP (!) 144/80   Pulse 81   SpO2 95%            GENERAL: The patient overall appears well and is no acute distress.   HEAD: normocephalic   EYES: Sclera and conjunctiva clear   NECK: no obvious masses   LUNGS: breathing is unlabored.   EXTREMITIES: No clubbing, cyanosis or edema   SKIN: No rashes or other abnormalities except as noted under the Wound section below.   NEUROLOGICAL: normal motor and sensory function   EDEMA: Moderate       WOUND: The wound appears healthy with no sign of infection.   Wound bed: necrotic material  Periwound: healthy intact skin  Fairly small superficial wound on the right anterior shin.    Wound measurement is much smaller today compared with her last clinic visit.      Also see below for wound details:       Circumferential volume measures:             No data to display                Ulceration(s)/Wound(s):   Please see the media tab under the chart review for pictures of the wounds.  Nursing staff removed dressings and cleansed wound.    VASC Wound right leg (Active)   Pre Size Length 0.6 10/16/23 1300   Pre Size Width 1.2 10/16/23 1300   Pre Size Depth 0.2 10/16/23 1300    Pre Total Sq cm 0.72 10/16/23 1300                   No results for input(s): HGBA1C, A1C, EAG in the last 62503 hours.    Invalid input(s): [id number]       No results for input(s): ALBUMIN in the last 24372 hours.           Procedure note: , I had previous obtain informed consent from the patient to perform serial debridements, I confirmed this again with the patient today verbally. , Anesthetized as needed with lidocaine. , Using a curette and/or a scalpel I performed an excisional debridement removing all necrotic material at the right leg wound in to the level of viable subcutaneous tissue.  I obtained hemostasis with direct pressure.  The patient tolerated the procedure well. , EBL: <5 ml , and Total debridement surface area: Less than 20 cm                  ASSESSMENT:   This is a 63 year old  female with a right leg ulcer. , the patient also has lower extremity edema which was also managed during today's clinic visit.          PLAN:   We will bandage the wound with endoform, Mepilex bandage and a Tubigrip stocking changed every other day.      Separate from the wound care instructions we then discussed management strategies for lower extremity edema.  I explained the keys for managing lower extremity edema are compression and elevation.  I have encouraged the patient to continue to elevate the legs as they have been doing, including laying down with their legs above the level of the heart for 30 minutes at least twice a day.     I have encouraged the patient to continue on their high protein diet to aid in wound healing.   The patient will return to the wound clinic in 2 to 3 weeks to see me again.        30 minutes spent on the date of the encounter doing chart review, history and exam, documentation and further activities per the note, this time excludes any procedure time      Herbert Baker MD  10/16/2023   1:34 PM   [facility]ound  [phone]    This note was  electronically signed by Herbert Baker MD

## 2023-10-21 ENCOUNTER — HEALTH MAINTENANCE LETTER (OUTPATIENT)
Age: 63
End: 2023-10-21

## 2023-10-30 ENCOUNTER — OFFICE VISIT (OUTPATIENT)
Dept: VASCULAR SURGERY | Facility: CLINIC | Age: 63
End: 2023-10-30
Attending: FAMILY MEDICINE
Payer: MEDICARE

## 2023-10-30 VITALS
OXYGEN SATURATION: 96 % | DIASTOLIC BLOOD PRESSURE: 85 MMHG | TEMPERATURE: 97 F | HEART RATE: 83 BPM | SYSTOLIC BLOOD PRESSURE: 151 MMHG

## 2023-10-30 DIAGNOSIS — L97.912 SKIN ULCER OF RIGHT LOWER LEG WITH FAT LAYER EXPOSED (H): Primary | ICD-10-CM

## 2023-10-30 DIAGNOSIS — R60.0 LOWER EXTREMITY EDEMA: ICD-10-CM

## 2023-10-30 PROCEDURE — 99214 OFFICE O/P EST MOD 30 MIN: CPT | Performed by: FAMILY MEDICINE

## 2023-10-30 PROCEDURE — G0463 HOSPITAL OUTPT CLINIC VISIT: HCPCS | Performed by: FAMILY MEDICINE

## 2023-10-30 ASSESSMENT — PAIN SCALES - GENERAL: PAINLEVEL: NO PAIN (0)

## 2023-10-30 NOTE — PROGRESS NOTES
Wound Clinic Note         Visit date: 10/30/2023       Cheif Complaint:     Alysas Bhatia is a 63 year old  female had no chief complaint listed for this encounter.  The patient has a right leg ulcer.          HISTORY OF PRESENT ILLNESS:    Alyssa Bhatia reports the wound has been present since mid August 2023.  The wound began due to the area being bumped.    Prior to this she has not had other difficult to heal wounds on the legs.  The patient denies a history of congestive heart failure, previous vein procedures or previous DVT.       Since her last clinic visit with me she has been bandaging the wound with endoform, Mepilex bandage and a Tubigrip stocking changed every other day.  There is been no drainage from the wound.  There is been no difficulties with the dressing changes.     The pateint denies fevers or chills.  They report the pain from the wound has been 0/10 and has remained about the same recently.      The patient reports laying down to elevate their legs above the level of their heart at least twice a day.  and The patient confirms they do sleep in a bed.     Today the patient reports maintaining a high protein diet, but has not been taking protein supplements lately.        The patient denies a history of diabetes, smoking or chronic steroid use.         The patient has not had any symptoms of infection relating to the wound recently and is not currently on antibiotics.       Problem List:   Past Medical History:   Diagnosis Date    Hypertension     Lower extremity edema 08/28/2023             Family Hx: family history includes Breast Cancer in her maternal aunt; Cataracts in her mother; Colon Cancer in her father; Hypertension in her father, maternal grandmother, and mother; Macular Degeneration in her maternal aunt, maternal grandmother, and mother.       Surgical Hx:   Past Surgical History:   Procedure Laterality Date    APPENDECTOMY      DILATION AND CURETTAGE  01/25/2007           Allergies:    Allergies   Allergen Reactions    Penicillins Other (See Comments)     Reaction as an infant - rash              Medication History:    Current Outpatient Medications   Medication Sig    amLODIPine (NORVASC) 10 MG tablet Take 10 mg by mouth daily    doxycycline hyclate (VIBRA-TABS) 100 MG tablet Take 100 mg by mouth daily (Patient not taking: Reported on 10/16/2023)    levofloxacin (LEVAQUIN) 750 MG tablet Take 750 mg by mouth daily (Patient not taking: Reported on 10/16/2023)     No current facility-administered medications for this visit.         Tobacco History:  reports that she has quit smoking. Her smoking use included cigarettes. She has never used smokeless tobacco.       REVIEW OF SYMPTOMS:   The review of systems was negative except as noted in the HPI.           PHYSICAL EXAMINATION:     BP (!) 151/85   Pulse 83   Temp 97  F (36.1  C) (Oral)   SpO2 96%            GENERAL: The patient overall appears well and is no acute distress.   HEAD: normocephalic   EYES: Sclera and conjunctiva clear   NECK: no obvious masses   LUNGS: breathing is unlabored.   EXTREMITIES: No clubbing, cyanosis or edema   SKIN: No rashes or other abnormalities except as noted under the Wound section below.   NEUROLOGICAL: normal motor and sensory function   EDEMA: Moderate       WOUND: The wound is scabbed over today with no sign of infection and no drainage even with palpation.      Also see below for wound details:       Circumferential volume measures:             No data to display                Ulceration(s)/Wound(s):   Please see the media tab under the chart review for pictures of the wounds.  Nursing staff removed dressings and cleansed wound.                       No results for input(s): HGBA1C, A1C, EAG in the last 87781 hours.    Invalid input(s): [id number]       No results for input(s): ALBUMIN in the last 75957 hours.           No sharp debridement performed today.                  ASSESSMENT:    This is a 63 year old  female with a right leg ulcer. , the patient also has lower extremity edema which was also managed during today's clinic visit.          PLAN:   I have recommended that she continue to keep the area covered with a Mepilex bandage change with bathing for another week or 2 until the scab falls off then no further bandages be required.  I do recommend that she get a pair of over-the-counter compression stockings to use long-term to control her swelling.    Separate from the wound care instructions we then discussed management strategies for lower extremity edema.  I explained the keys for managing lower extremity edema are compression and elevation.  I have encouraged the patient to continue to elevate the legs as they have been doing, including laying down with their legs above the level of the heart for 30 minutes at least twice a day.     I have encouraged the patient to continue on their high protein diet to aid in wound healing.   The patient will return to the wound clinic on an as-needed basis if further wounds develop.        30 minutes spent on the date of the encounter doing chart review, history and exam, documentation and further activities per the note, this time excludes any procedure time      Herbert Baker MD  10/30/2023   2:28 PM   [facility]/Wound  [phone]    This note was electronically signed by Herbert Baker MD

## 2024-12-08 ENCOUNTER — HEALTH MAINTENANCE LETTER (OUTPATIENT)
Age: 64
End: 2024-12-08

## 2025-06-29 ENCOUNTER — HEALTH MAINTENANCE LETTER (OUTPATIENT)
Age: 65
End: 2025-06-29